# Patient Record
(demographics unavailable — no encounter records)

---

## 2025-02-20 NOTE — PHYSICAL EXAM
[Well Developed] : well developed [Well Nourished] : well nourished [Conjunctiva] : the conjunctiva were normal in both eyes [PERRL] : pupils were equal in size, round, and reactive to light [EOM Intact] : extraocular movements were intact [Normal Appearance] : was normal in appearance [Neck Supple] : was supple [Enlarged Diffusely] : was not enlarged [Rate ___] : at [unfilled] breaths per minute [Normal Rhythm/Effort] : normal respiratory rhythm and effort [Clear Bilaterally] : the lungs were clear to auscultation bilaterally [Normal to Percussion] : the lungs were normal to percussion [Heart Rate ___] : [unfilled] bpm [Rhythm Regular] : regular [Normal Rate] : normal [Normal S1] : normal S1 [Normal S2] : normal S2 [S3] : no S3 [S4] : no S4 [No Murmur] : no murmurs heard [No Pitting Edema] : no pitting edema present [Rt] : no varicose veins of the right leg [Lt] : no varicose veins of the left leg [Right Carotid Bruit] : no bruit heard over the right carotid [Left Carotid Bruit] : no bruit heard over the left carotid [Right Femoral Bruit] : no bruit heard over the right femoral artery [Left Femoral Bruit] : no bruit heard over the left femoral artery [2+] : left 2+ [No Abnormalities] : the abdominal aorta was not enlarged and no bruit was heard [Bruit] : no bruit heard [Examination Of The Breasts] : a normal appearance [No Discharge] : no discharge [Flat] : flat [Soft, Nontender] : the abdomen was soft and nontender [No Mass] : no masses were palpated [No HSM] : no hepatosplenomegaly noted [None] : no CVA tenderness [Postauricular Lymph Nodes Enlarged Bilaterally] : nodes not enlarged [Preauricular Lymph Nodes Enlarged Bilaterally] : nodes not enlarged [Submandibular Lymph Nodes Enlarged Bilaterally] : nodes not enlarged [Suboccipital Lymph Nodes Enlarged Bilaterally] : nodes not enlarged [Submental Lymph Nodes Enlarged] : nodes not enlarged [Cervical Lymph Nodes Enlarged Posterior Bilaterally] : nodes not enlarged [Cervical Lymph Nodes Enlarged Anterior Bilaterally] : nodes not enlarged [Supraclavicular Lymph Nodes Enlarged Bilaterally] : nodes not enlarged [Axillary Lymph Nodes Enlarged Bilaterally] : nodes not enlarged [Epitrochlear Lymph Nodes Enlarged Bilaterally] : nodes not enlarged [Femoral Lymph Nodes Enlarged Bilaterally] : nodes not enlarged [Inguinal Lymph Nodes Enlarged Bilaterally] : nodes not enlarged [No Lymphangitis] : no lymphangitis observed [Normal Kyphosis] : normal kyphosis [No Visual Abnormalities] : no visible abnormalities [Normal Lordosis] : normal lordosis [No Scoliosis] : no scoliosis [No Tenderness to Palpation] : no spine tenderness on palpation [No Masses] : no masses [Full ROM] : full ROM [No Pain with ROM] : no pain with motion in any direction [Intact] : all reflexes within normal limits bilaterally [Normal Station and Gait] : the gait and station were normal [Normal Motor Tone] : the muscle tone was normal [Involuntary Movements] : no involuntary movements were seen [Normal Scalp] : inspection of the scalp showed no abnormalities [Examination Of The Hair] : texture and distribution of hair was normal [Abnormal Color] : normal color and pigmentation [Complexion Medium] : medium complexion [Skin Lesions 1] : no skin lesions were observed [Both Ears Pierced] : both ears [Skin Turgor Decreased] : normal skin turgor [Normal] : the deep tendon reflexes were normal [Normal Mental Status] : the patient's orientation, memory, attention, language and fund of knowledge were normal [Appropriate] : appropriate [Impaired judgment] : intact judgment [Impaired Insight] : intact insight [de-identified] : tongue normal teeth in good repair

## 2025-02-20 NOTE — COUNSELING
[Sleep ___ hours/day] : Sleep [unfilled] hours/day [Engage in a relaxing activity] : Engage in a relaxing activity [Plan in advance] : Plan in advance [____ min/wk Activity] : [unfilled] min/wk activity [FreeTextEntry2] : bmi 22 125 [None] : None

## 2025-02-20 NOTE — HISTORY OF PRESENT ILLNESS
[FreeTextEntry1] : cpe   [de-identified] : pt is a 27 yr old woman with ascus, eosinophilia and milk intolerance and trichotillomania who is here for cpe Pt states  she has started therapy for her trichotillomania and started 5 yrs ago and started with stress of school and work a nd lost of family member  her uncle.  she -denies any headaches, nausea, vomiting, fever, chills, sweats, chest pain, chest pressure, diarrhea, constipation, dysphagia, sour taste in the mouth, dizziness, leg swelling, leg pain, myalgias, arthralgias, itchy eyes, itchy ears, heartburn, or reflux.

## 2025-02-20 NOTE — ASSESSMENT
[FreeTextEntry1] : health She is up to date with dental needs eye exam gyn due in march 2 bmi 22 continue healthy eating and exercise  3. hpv  fu with gyn discuss Gardasil  4. herpes  fu with  gyn  take valtrex  could try lysine 500mg  daily 5 trichotillomania  pt is decreasing  times she pulls her hair  out  and is seeing a therapist.  5 milk intolerance avoids  dairy.  no problems 6 anxiety  occ occurs  markedly improved  Treatments include:  ?Psychotherapy  Psychotherapy involves meeting with a mental health counselor to talk about your feelings, relationships, and worries. Therapy can help you find new ways of thinking about your situation so that you feel less anxious. In therapy, you might also learn new skills to reduce anxiety.   ?Medicines  Medicines used to treat depression can relieve anxiety, too, even in people who are not depressed. Your doctor or nurse will decide which medicines are best for your situation.   Some people have psychotherapy and take medicines at the same time.  There is no reason to feel embarrassed about getting treatment for anxiety. Anxiety is a common problem. It affects all kinds of people.  Keep in mind that it might take a little while to find the right treatment. People respond in different ways to medicines and therapy, so you might need to try a few approaches before you find the 1 that helps you most. The key is to not give up and to let your doctor or nurse know how you feel along the way.Treatment begins with education on:  ?Informing and correcting misconceptions regarding anxiety, worry, and associated symptoms   ?Causative factors of pathological worry and anxiety   ?A model of factors that perpetuate PINA   ?The treatment plan and rationale (ie, symptoms of PINA will subside by using evidence-based and coping oriented thinking, by dealing directly with anxiety provoking images and situations, and by learning to relax)   Much of this information is integrated in presenting how a pathological cycle of worry and anxiety develops and is maintained in patients lives.  Self-monitoring  Self-monitoring is introduced in the first treatment session and continues throughout the entire treatment. Learning to observe their reactions from an objective standpoint encourages the patients development as a personal  and increases his or her accuracy in self-observation. Self-monitoring allows patients to chart their progress in therapy.  Patients keep track of significant episodes of worry on a Worry Record (form 1) to be completed as soon as possible during or after each worry episode. The record provides a description of the cues, maximal distress, and symptoms, thoughts, and behaviors. Patients additionally complete a daily mood record at the end of each day to record overall or average levels of anxiety.  Relaxation training  Relaxation training can be particularly meaningful for PINA patients as they often experience elevated muscle tension and reduced flexibility of autonomic functioning [36]. Relaxation training consists of progressive muscle relaxation (after brief deliberate tension) [37] of all muscle groups of the body in a systematic manner, beginning with 16 muscle groups, and then condensing to 8 muscle groups, and 4 muscle groups.  Relaxation training ends with cue-control relaxation, where patients cue themselves to relax by simply repeating a word (such as relax) that has been repeatedly paired with relaxation phases during the preceding weeks of progressive muscle relaxation training. Cue-control relaxation is then used as a coping skill for practicing exposure to anxiety-producing images or situations (also referred to as applied relaxation). Breathing exercises, such as slow, diaphragmatic breathing, may be incorporated into relaxation training.  Cognitive restructuring  Cognitive restructuring is a set of skills for identifying and modifying misappraisals that contribute to anxiety, including:  ?Patients are shown how anxiety and maladaptive behaviors are generated by overly-negative interpretations of events.   ?Patients are helped to identify errors in thinking (eg, overestimating the probability or valence of negative events) and rigid rules or beliefs that underlie dysfunctional thought patterns.   ?Patients are encouraged to use an empirical approach to examine the validity of thoughts by considering all of the available evidence.   Therapists use Socratic questioning to help patients make guided discoveries and question their anxious thinking. Patients then generate alternative interpretations or hypotheses to situations with the help of additional evidence gathered in behavioral practices in anxiety-provoking situations. As an example, a person who typically avoided taking on new responsibilities due to worries about making mistakes was encouraged to take on new responsibilities, to gather evidence on what happens subsequently. He or she learned that mistakes were less frequent than anticipated and did not have negative consequences. Underlying beliefs (eg, I am incompetent) are postulated to change with the patients accrual of evidence that challenges his or her negative thoughts.  Cognitive bias modification programs have been developed using paradigms initially developed to assess biases in attention (ie, dot probe) [10]. In the modification programs, individuals are trained to attend to neutral (instead of negative) words or images, or are trained to develop less negative interpretations of ambiguous material. However, effect sizes are relatively small and from the most recent meta-analysis, become nonsignificant when outliers are excluded [38].  Imagery exposure  Imagery exposure is designed to help patients tolerate negative affect and autonomic arousal associated with fearful images that they often attempt to avoid through worry [39]. Patients generate hierarchies of fear images related to two or three main areas of worry and are led through systematic exposure to these images. When anxiety elicited by an image is reduced to a mild level, then patients progress to the next image on the hierarchy.  Two main versions of imagery exposure have been developed   ?In one version, patients imagine a worst case scenario for 25 to 30 minutes, after which they generate alternative outcomes to the scenario. This approach has been shown to be effective for PINA as a standalone treatment in a small randomized trial    ?The second version, self-controlled desensitization, involves utilization of cognitive restructuring and relaxation skills during imagery exposure to anxiety-provoking situations. It has been incorporated into CBT in a number of studies [43].   Exposure to anxiety-provoking situations  This technique involves repeated exposure to situations that are avoided or engaged in with excessive preparation or checking. Patients generate a hierarchy of situations or activities. Examples include allowing children to have sleep overs, family vacations, arriving on time (instead of excessively early) at scheduled appointments, taking on responsibilities, or saying no to requests. Patients rehearse cognitive restructuring and relaxation coping skills in session. Subsequently, they practice using these techniques to manage anxiety in situations that occur between sessions.  Additional components  Other techniques that may be incorporated into CBT for PINA include:  ?Problem-solving to combat indecisiveness and increase the ability to generate alternative solutions to problems    ?Time management training and goal setting to facilitate present task accomplishment instead of allowing worry to dominate   ?Intermittent motivational interviewing. In a randomized trial of patients with various anxiety disorders receiving CBT, adjunctive motivational interviewing resulted in subjective improvements in anxiety symptoms as well as other comorbid symptoms, such as depression, compared with adjunctive psychoeducation  .

## 2025-02-20 NOTE — HEALTH RISK ASSESSMENT
[Excellent] : ~his/her~  mood as  excellent [Yes] : Yes [2 - 4 times a month (2 pts)] : 2-4 times a month (2 points) [1 or 2 (0 pts)] : 1 or 2 (0 points) [Never (0 pts)] : Never (0 points) [No] : In the past 12 months have you used drugs other than those required for medical reasons? No [No falls in past year] : Patient reported no falls in the past year [Little interest or pleasure doing things] : 1) Little interest or pleasure doing things [Feeling down, depressed, or hopeless] : 2) Feeling down, depressed, or hopeless [0] : 2) Feeling down, depressed, or hopeless: Not at all (0) [PHQ-2 Negative - No further assessment needed] : PHQ-2 Negative - No further assessment needed [Never] : Never [NO] : No [Patient reported PAP Smear was abnormal] : Patient reported PAP Smear was abnormal [HIV test declined] : HIV test declined [Hepatitis C test offered] : Hepatitis C test offered [None] : None [With Family] : lives with family [# of Members in Household ___] :  household currently consist of [unfilled] member(s) [Employed] : employed [College] : College [Single] : single [# Of Children ___] : has [unfilled] children [Sexually Active] : sexually active [Feels Safe at Home] : Feels safe at home [Fully functional (bathing, dressing, toileting, transferring, walking, feeding)] : Fully functional (bathing, dressing, toileting, transferring, walking, feeding) [Fully functional (using the telephone, shopping, preparing meals, housekeeping, doing laundry, using] : Fully functional and needs no help or supervision to perform IADLs (using the telephone, shopping, preparing meals, housekeeping, doing laundry, using transportation, managing medications and managing finances) [Smoke Detector] : smoke detector [Carbon Monoxide Detector] : carbon monoxide detector [Safety elements used in home] : safety elements used in home [Seat Belt] :  uses seat belt [Sunscreen] : uses sunscreen [FreeTextEntry1] : none  [Audit-CScore] : 2 [de-identified] : works out 4-5 times a week [de-identified] : reg  [[id number]] : 0 [Change in mental status noted] : No change in mental status noted [Language] : denies difficulty with language [Behavior] : denies difficulty with behavior [Learning/Retaining New Information] : denies difficulty learning/retaining new information [Handling Complex Tasks] : denies difficulty handling complex tasks [Reasoning] : denies difficulty with reasoning [Spatial Ability and Orientation] : denies difficulty with spatial ability and orientation [Reports changes in hearing] : Reports no changes in hearing [Reports changes in vision] : Reports no changes in vision [Reports changes in dental health] : Reports no changes in dental health [Travel to Developing Areas] : does not  travel to developing areas [TB Exposure] : is not being exposed to tuberculosis [Caregiver Concerns] : does not have caregiver concerns [PapSmearDate] : 3/25/24  [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

## 2025-03-24 NOTE — PHYSICAL EXAM
[Chaperone Present] : A chaperone was present in the examining room during all aspects of the physical examination [Appropriately responsive] : appropriately responsive [Alert] : alert [No Acute Distress] : no acute distress [No Lymphadenopathy] : no lymphadenopathy [Regular Rate Rhythm] : regular rate rhythm [No Murmurs] : no murmurs [Clear to Auscultation B/L] : clear to auscultation bilaterally [Soft] : soft [Non-tender] : non-tender [Non-distended] : non-distended [No HSM] : No HSM [No Lesions] : no lesions [No Mass] : no mass [Oriented x3] : oriented x3 [Examination Of The Breasts] : a normal appearance [No Masses] : no breast masses were palpable [Labia Majora] : normal [Labia Minora] : normal [Discharge] : discharge [Moderate] : moderate [White] : white [Thick] : thick [Normal] : normal [Normal Position] : in a normal position [Uterine Adnexae] : normal [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

## 2025-03-24 NOTE — HISTORY OF PRESENT ILLNESS
[Normal Amount/Duration] :  normal amount and duration [Regular Cycle Intervals] : periods have been regular [Menarche Age: ____] : age at menarche was [unfilled] [Currently Active] : currently active [Men] : men [Vaginal] : vaginal [No] : No [Yes] : Yes [Condoms] : Condoms [Patient would like to be screened for STIs] : Patient would like to be screened for STIs [FreeTextEntry1] : 03/16/2025
